# Patient Record
Sex: FEMALE | Race: WHITE | NOT HISPANIC OR LATINO | Employment: OTHER | URBAN - NONMETROPOLITAN AREA
[De-identification: names, ages, dates, MRNs, and addresses within clinical notes are randomized per-mention and may not be internally consistent; named-entity substitution may affect disease eponyms.]

---

## 2022-10-19 ENCOUNTER — OFFICE VISIT (OUTPATIENT)
Dept: ORTHOPEDIC SURGERY | Facility: CLINIC | Age: 67
End: 2022-10-19

## 2022-10-19 VITALS — WEIGHT: 195 LBS | HEIGHT: 62 IN | BODY MASS INDEX: 35.88 KG/M2

## 2022-10-19 DIAGNOSIS — M25.562 PAIN IN BOTH KNEES, UNSPECIFIED CHRONICITY: Primary | ICD-10-CM

## 2022-10-19 DIAGNOSIS — M17.0 BILATERAL PRIMARY OSTEOARTHRITIS OF KNEE: ICD-10-CM

## 2022-10-19 DIAGNOSIS — M25.561 PAIN IN BOTH KNEES, UNSPECIFIED CHRONICITY: Primary | ICD-10-CM

## 2022-10-19 PROCEDURE — 99204 OFFICE O/P NEW MOD 45 MIN: CPT | Performed by: SPECIALIST/TECHNOLOGIST, OTHER

## 2022-10-19 RX ORDER — LOSARTAN POTASSIUM 100 MG/1
TABLET ORAL
COMMUNITY

## 2022-10-19 RX ORDER — MONTELUKAST SODIUM 10 MG/1
TABLET ORAL
COMMUNITY

## 2022-10-19 NOTE — PROGRESS NOTES
Jean Bamberger is a 67 y.o. female   Primary provider:  Provider, No Known       Chief Complaint   Patient presents with   • Left Knee - Pain   • Right Knee - Pain   • Establish Care       HISTORY OF PRESENT ILLNESS:  67-year-old female patient who is here for the first time to see as as she moved from [place].  She had a bilateral knee osteoarthritis left being worse and she had 3 knee arthroscopies in the left knee the last 1 being in 2016.  Her prior orthopedic physician provider suggested on her to undergo left total knee replacement which she would like to defer as much as possible understandably.  However her quality of life has been compromised by the stiffness and pain in the left knee for which she has been getting viscosupplementation injections alternating with steroid injections for the past 2 years with between 3 to 6-month intervals with good relief and she prefers to continue the same treatment plan.    Conservative measures of rest, activity modification, viscosupplementation and local steroid injection seems to help her partially with the bilateral knee complaints.  Pain  The problem occurs constantly. Associated symptoms include joint swelling. Associated symptoms comments: Crushing, grinding, burning, clicking. The symptoms are aggravated by standing. She has tried acetaminophen, NSAIDs and rest (injections) for the symptoms.        CONCURRENT MEDICAL HISTORY:  The following portions of the patient's history were reviewed and updated as appropriate: allergies, current medications, past family history, past medical history, past social history, past surgical history and problem list.     Past Medical History:   Diagnosis Date   • Asthma        No Known Allergies      Current Outpatient Medications:   •  ALBUTEROL IN, Inhale., Disp: , Rfl:   •  losartan (COZAAR) 100 MG tablet, losartan 100 mg tablet  Take 1 tablet every day by oral route., Disp: , Rfl:   •  montelukast (SINGULAIR) 10 MG tablet,  "Singulair 10 mg tablet  Take 1 tablet every day by oral route., Disp: , Rfl:     Past Surgical History:   Procedure Laterality Date   • KNEE SURGERY     • NECK SURGERY         Family History   Problem Relation Age of Onset   • Hypertension Other        Social History     Socioeconomic History   • Marital status: Unknown   Tobacco Use   • Smoking status: Never   • Smokeless tobacco: Never   Substance and Sexual Activity   • Alcohol use: Never        Review of Systems   Respiratory: Positive for wheezing.    Musculoskeletal: Positive for joint swelling.   Hematological: Bruises/bleeds easily.   All other systems reviewed and are negative.      PHYSICAL EXAMINATION:       Ht 157.5 cm (62\")   Wt 88.5 kg (195 lb)   BMI 35.67 kg/m²     Physical Exam  Vitals and nursing note reviewed. Exam conducted with a chaperone present.   HENT:      Head: Normocephalic and atraumatic.   Cardiovascular:      Rate and Rhythm: Normal rate and regular rhythm.      Pulses: Normal pulses.      Heart sounds: Normal heart sounds.   Pulmonary:      Effort: Pulmonary effort is normal.      Breath sounds: Normal breath sounds.   Abdominal:      General: Abdomen is flat.      Palpations: Abdomen is soft.   Musculoskeletal:         General: Swelling, tenderness and deformity present.      Cervical back: Normal range of motion and neck supple.      Right knee:      Instability Tests: Medial Kina test positive.      Left knee: Effusion present.      Instability Tests: Medial Kina test positive.   Skin:     General: Skin is warm and dry.      Capillary Refill: Capillary refill takes less than 2 seconds.   Neurological:      General: No focal deficit present.      Mental Status: She is oriented to person, place, and time.   Psychiatric:         Mood and Affect: Mood normal.         Behavior: Behavior normal.         GAIT:     []  Normal  [x]  Antalgic    Assistive device: [x]  None  []  Walker     []  Crutches  []  Cane     []  " Wheelchair  []  Stretcher    Right Knee Exam     Muscle Strength   The patient has normal right knee strength.    Tenderness   The patient is experiencing tenderness in the medial hamstring, medial joint line, MCL and patellar tendon.    Range of Motion   Extension:  0 abnormal   Flexion:  110 abnormal     Tests   Kina:  Medial - positive   Varus: positive   Lachman:  Anterior - negative    Posterior - negative  Drawer:  Anterior - negative    Posterior - negative  Pivot shift: negative  Patellar apprehension: 2+    Other   Erythema: absent  Scars: absent  Sensation: normal  Pulse: present  Swelling: mild      Left Knee Exam     Tenderness   The patient is experiencing tenderness in the medial retinaculum, patella, patellar tendon, pes anserinus, medial hamstring, MCL, LCL, lateral retinaculum, lateral joint line and medial joint line.    Range of Motion   Extension: 15   Flexion: 100     Tests   Kina:  Medial - positive   Varus: positive   Lachman:  Anterior - negative    Posterior - negative  Drawer:  Anterior - negative     Posterior - negative  Pivot shift: negative  Patellar apprehension: 2+    Other   Erythema: absent  Scars: absent  Sensation: normal  Pulse: present  Swelling: moderate  Effusion: effusion present                  No results found.        ASSESSMENT:  67-year-old female patient who suffers from bilateral knee osteoarthritis, left being worse than the right side.  Her mobility and gait, ADLs and night sleep are being affected by the severe symptoms on the left knee.  Past conservative measures of rest, activity modification, local steroid injection alternating with local viscosupplementation injection seem to have helped her.  Diagnoses and all orders for this visit:    Pain in both knees, unspecified chronicity  -     XR Knee Bilateral AP Standing  -     XR Knee 1 or 2 View Right  -     XR Knee 1 or 2 View Left    Other orders  -     montelukast (SINGULAIR) 10 MG tablet; Singulair 10  mg tablet   Take 1 tablet every day by oral route.  -     losartan (COZAAR) 100 MG tablet; losartan 100 mg tablet   Take 1 tablet every day by oral route.  -     ALBUTEROL IN; Inhale.      -     XR Knee 1 or 2 View Left  -     XR Knee Bilateral AP Standing      Result Date: 10/17/2022  Narrative: X-ray knee 1 or 2 views left, x-ray knee bilateral AP standing,   CLINICAL HISTORY: 67 years male, exertion, injury   COMPARISON: none.   FINDINGS: Severe tricompartmental osteoarthritic changes in the left knee noted with decreased medial joint space and bone-on-bone articulation, osteophytes along with genu varum with the tibia bowing more so when compared to the right side which has moderate osteoarthritis in all 3 compartments.. Mild surrounding soft tissue swelling with mild to moderate joint effusion in the left knee noted. No dislocation.     Impression: Severe tricompartmental osteoarthritis in the left knee compared to the mild to moderate arthritis in the right knee electronically signed by: Otto Le MD  10/17/2022 10.30M CDT Workstation: [id number]      Different treatment options with the patient that included continuing the present conservative line of management with home exercises that were taught to her today, and we would like to put in the request for the viscosupplementation and upon approval we will bring her back to the clinic to give injection to both her knees.  Also it was discussed to minimize the steroid injection locally into the knees if possible and discussed in detail regarding the long-term need for total knee replacement in her left knee to begin with as a definitive treatment plan for a complete relief.  PLAN  We will review her in a week to 10 days following approval of the viscosupplementation injections to both knees.  Meantime patient is advised to continue the conservative line of management with rest, activity modification and home exercises, stretching and strengthening for both  knees and over-the-counter pain meds.  In case her viscosupplementation injection approval gets delayed, we will consider giving her local steroid injection into the both knees for temporary relief.  The patient voiced understanding of the risks, benefits, and alternative forms of treatment that were discussed and the patient agreed to the treatment plan.  This discussion was held with the patient by  Otto Le MD and all questions were answered.  EMR Dragon/Transciption Disclaimer: Some of this note may be an electronic transcription/translation of spoken language to printed text using the Dragon Dictation System.  Time spent of a minimum of 45 minutes including the face to face evaluation, reviewing of medical history and prior medial records, reviewing of diagnostic studies, documentation, patient education and coordination of care and surgical treatment decision.         No follow-ups on file.    Otto Le MD

## 2022-10-28 ENCOUNTER — CLINICAL SUPPORT (OUTPATIENT)
Dept: ORTHOPEDIC SURGERY | Facility: CLINIC | Age: 67
End: 2022-10-28

## 2022-10-28 VITALS — BODY MASS INDEX: 35.67 KG/M2 | HEIGHT: 62 IN

## 2022-10-28 DIAGNOSIS — M17.0 BILATERAL PRIMARY OSTEOARTHRITIS OF KNEE: Primary | ICD-10-CM

## 2022-10-28 PROCEDURE — 20610 DRAIN/INJ JOINT/BURSA W/O US: CPT | Performed by: SPECIALIST/TECHNOLOGIST, OTHER

## 2022-10-28 PROCEDURE — 99213 OFFICE O/P EST LOW 20 MIN: CPT | Performed by: SPECIALIST/TECHNOLOGIST, OTHER

## 2022-10-28 NOTE — PROGRESS NOTES
"Knee Joint Injection      Patient: Jean Bamberger    YOB: 1955    Chief Complaint   Patient presents with   • Injections     Bilateral knee       History of Present Illness:  67-year-old female patient who is here for the first time to see as as she moved from [place].  She had a bilateral knee osteoarthritis left being worse and she had 3 knee arthroscopies in the left knee the last 1 being in 2016.  Her prior orthopedic physician provider suggested on her to undergo left total knee replacement which she would like to defer as much as possible understandably.  However her quality of life has been compromised by the stiffness and pain in the left knee for which she has been getting viscosupplementation injections alternating with steroid injections for the past 2 years with between 3 to 6-month intervals with good relief and she prefers to continue the same treatment plan.     Conservative measures of rest, activity modification, viscosupplementation and local steroid injection seems to help her partially with the bilateral knee complaints.  Patient is here for viscosupplementation injection to both her knees.  No new complaints.    Physical Exam: 67 y.o. female  General Appearance:    Alert, cooperative, in no acute distress                   Vitals:    10/28/22 1044   Height: 157.5 cm (62\")        Patient is alert and oriented, ×3 no acute distress.  Affect is normal respiratory rate is normal unlabored.  Exam and complaints are unchanged.    Procedure:  Large Joint Arthrocentesis: R knee  Date/Time: 10/28/2022 10:45 AM  Consent given by: patient  Site marked: site marked  Timeout: Immediately prior to procedure a time out was called to verify the correct patient, procedure, equipment, support staff and site/side marked as required   Supporting Documentation  Indications: pain   Procedure Details  Location: knee - R knee  Preparation: Patient was prepped and draped in the usual sterile " fashion  Needle size: 22 G  Approach: anteromedial  Medications administered: 48 mg hylan 48 MG/6ML  Patient tolerance: patient tolerated the procedure well with no immediate complications    Large Joint Arthrocentesis: L knee  Date/Time: 10/28/2022 10:45 AM  Consent given by: patient  Site marked: site marked  Timeout: Immediately prior to procedure a time out was called to verify the correct patient, procedure, equipment, support staff and site/side marked as required   Supporting Documentation  Indications: pain   Procedure Details  Location: knee - L knee  Preparation: Patient was prepped and draped in the usual sterile fashion  Needle size: 22 G  Approach: anteromedial  Medications administered: 48 mg hylan 48 MG/6ML  Patient tolerance: patient tolerated the procedure well with no immediate complications          Assessment:  67-year-old female patient who suffers from bilateral knee osteoarthritis, left being worse than the right side. Her mobility and gait, ADLs and night sleep are being affected by the severe symptoms on the left knee.Past conservative measures of rest, activity modification, local steroid injection alternating with local viscosupplementation injection seem to have helped her.  Here for viscosupplementation injection to both knees today.      Diagnoses and all orders for this visit:    Bilateral primary osteoarthritis of knee    Other orders  -     Large Joint Arthrocentesis: R knee  -     Large Joint Arthrocentesis: L knee        Plan:     Slowly increase activity as tolerated.  Discussed importance of leg strengthening and general conditioning.  Discussed warning signs of injection.  Discussed that purpose of injections was symptom improvement and improved activity.  Also discussed that further treatment options depended on symptoms at followup and length of time of improvement after injections.    Meantime patient is advised to continue the conservative line of management with rest,  activity modification and home exercises, stretching and strengthening for both knees and over-the-counter pain meds.    The patient voiced understanding of the risks, benefits, and alternative forms of treatment that were discussed and the patient agreed to the treatment plan.  This discussion was held with the patient by  Otto Le MD and all questions were answered.    EMR Dragon/Transciption Disclaimer: Some of this note may be an electronic transcription/translation of spoken language to printed text using the Dragon Dictation System.  Time spent of a minimum of 25 minutes including the face to face evaluation, reviewing of medical history and prior medial records, reviewing of diagnostic studies, documentation, patient education and coordination of care and surgical treatment decision.     No follow-ups on file.    Otto Le MD

## 2023-06-15 ENCOUNTER — PREP FOR SURGERY (OUTPATIENT)
Dept: OTHER | Facility: HOSPITAL | Age: 68
End: 2023-06-15
Payer: MEDICARE

## 2023-06-15 DIAGNOSIS — Z86.010 PERSONAL HISTORY OF COLONIC POLYPS: Primary | ICD-10-CM

## 2023-06-15 RX ORDER — SODIUM CHLORIDE 9 MG/ML
40 INJECTION, SOLUTION INTRAVENOUS AS NEEDED
OUTPATIENT
Start: 2023-06-15

## 2023-06-15 RX ORDER — DEXTROSE AND SODIUM CHLORIDE 5; .45 G/100ML; G/100ML
30 INJECTION, SOLUTION INTRAVENOUS CONTINUOUS PRN
OUTPATIENT
Start: 2023-06-15

## 2023-06-19 PROBLEM — Z86.010 PERSONAL HISTORY OF COLONIC POLYPS: Status: ACTIVE | Noted: 2023-06-19
